# Patient Record
Sex: MALE | Race: WHITE
[De-identification: names, ages, dates, MRNs, and addresses within clinical notes are randomized per-mention and may not be internally consistent; named-entity substitution may affect disease eponyms.]

---

## 2024-08-15 ENCOUNTER — DASHBOARD ENCOUNTERS (OUTPATIENT)
Age: 19
End: 2024-08-15

## 2024-08-15 ENCOUNTER — OFFICE VISIT (OUTPATIENT)
Dept: URBAN - METROPOLITAN AREA CLINIC 12 | Facility: CLINIC | Age: 19
End: 2024-08-15
Payer: SELF-PAY

## 2024-08-15 ENCOUNTER — OFFICE VISIT (OUTPATIENT)
Dept: URBAN - METROPOLITAN AREA CLINIC 27 | Facility: CLINIC | Age: 19
End: 2024-08-15

## 2024-08-15 ENCOUNTER — LAB OUTSIDE AN ENCOUNTER (OUTPATIENT)
Dept: URBAN - METROPOLITAN AREA CLINIC 12 | Facility: CLINIC | Age: 19
End: 2024-08-15

## 2024-08-15 ENCOUNTER — TELEPHONE ENCOUNTER (OUTPATIENT)
Dept: URBAN - METROPOLITAN AREA CLINIC 6 | Facility: CLINIC | Age: 19
End: 2024-08-15

## 2024-08-15 VITALS
TEMPERATURE: 97.8 F | HEIGHT: 73 IN | HEART RATE: 88 BPM | BODY MASS INDEX: 19.48 KG/M2 | DIASTOLIC BLOOD PRESSURE: 86 MMHG | SYSTOLIC BLOOD PRESSURE: 119 MMHG | WEIGHT: 147 LBS

## 2024-08-15 DIAGNOSIS — R13.19 ESOPHAGEAL DYSPHAGIA: ICD-10-CM

## 2024-08-15 DIAGNOSIS — R14.0 POSTPRANDIAL BLOATING: ICD-10-CM

## 2024-08-15 PROCEDURE — 99204 OFFICE O/P NEW MOD 45 MIN: CPT | Performed by: STUDENT IN AN ORGANIZED HEALTH CARE EDUCATION/TRAINING PROGRAM

## 2024-08-15 RX ORDER — OMEPRAZOLE 40 MG/1
1 CAPSULE 30 MINUTES BEFORE MORNING MEAL CAPSULE, DELAYED RELEASE ORAL ONCE A DAY
Qty: 14 | Refills: 0 | OUTPATIENT
Start: 2024-08-15

## 2024-08-15 NOTE — HPI-TODAY'S VISIT:
19-year-old male with a history of asthma on as needed albuterol who presents with 1 year of postprandial dyspnea.  Reports that over the past year he has had progressive shortness of breath after eating.  Does not associate symptom onset with any particular illness, lifestyle change, medication change.  He is accompanied by his mother who aids in providing details of the medical history.  He was raised in [place] but is currently going to school in [place], mother lives here in [place].  He went to the [employer] over the summer. Primary symptom includes shortness of breath and chest tightness after eating.  Symptoms improved with standing up and walking around, reports some chiropractic maneuvers which may improve symptoms.  Symptoms worsen after eating and when sitting down.  Does endorse some mild dysphagia or having to cough food back up.  Reports that the symptoms have caused him to be anxious.  Has trialed his as needed Ventolin at the time of shortness of breath which have not been helpful.  Does not associate symptoms with any particular food but does say is less common with shakes.  Has had difficulty going to school and attending recreational activities due to symptom burden.  Prior to an outside of these symptoms, no other active medical problems.  No family history of gastrointestinal malignancy.  No abdominal surgeries.

## 2024-08-16 ENCOUNTER — CLAIMS CREATED FROM THE CLAIM WINDOW (OUTPATIENT)
Dept: URBAN - METROPOLITAN AREA SURGERY CENTER 15 | Facility: SURGERY CENTER | Age: 19
End: 2024-08-16
Payer: SELF-PAY

## 2024-08-16 ENCOUNTER — CLAIMS CREATED FROM THE CLAIM WINDOW (OUTPATIENT)
Dept: URBAN - METROPOLITAN AREA CLINIC 4 | Facility: CLINIC | Age: 19
End: 2024-08-16
Payer: SELF-PAY

## 2024-08-16 DIAGNOSIS — K21.9 GASTRO-ESOPHAGEAL REFLUX DISEASE WITHOUT ESOPHAGITIS: ICD-10-CM

## 2024-08-16 DIAGNOSIS — K31.89 OTHER DISEASES OF STOMACH AND DUODENUM: ICD-10-CM

## 2024-08-16 DIAGNOSIS — R13.19 CERVICAL DYSPHAGIA: ICD-10-CM

## 2024-08-16 DIAGNOSIS — K25.7 CHRONIC GASTRIC ULCER WITHOUT HEMORRHAGE OR PERFORATION: ICD-10-CM

## 2024-08-16 DIAGNOSIS — K21.9 ACID REFLUX: ICD-10-CM

## 2024-08-16 PROCEDURE — 43239 EGD BIOPSY SINGLE/MULTIPLE: CPT | Performed by: STUDENT IN AN ORGANIZED HEALTH CARE EDUCATION/TRAINING PROGRAM

## 2024-08-16 PROCEDURE — 88312 SPECIAL STAINS GROUP 1: CPT | Performed by: PATHOLOGY

## 2024-08-16 PROCEDURE — 88305 TISSUE EXAM BY PATHOLOGIST: CPT | Performed by: PATHOLOGY

## 2024-08-16 PROCEDURE — 00731 ANES UPR GI NDSC PX NOS: CPT | Performed by: NURSE ANESTHETIST, CERTIFIED REGISTERED

## 2024-08-16 RX ORDER — OMEPRAZOLE 40 MG/1
1 CAPSULE 30 MINUTES BEFORE MORNING MEAL CAPSULE, DELAYED RELEASE ORAL ONCE A DAY
Qty: 14 | Refills: 0 | Status: ACTIVE | COMMUNITY
Start: 2024-08-15

## 2024-08-22 ENCOUNTER — OFFICE VISIT (OUTPATIENT)
Dept: URBAN - METROPOLITAN AREA CLINIC 12 | Facility: CLINIC | Age: 19
End: 2024-08-22

## 2024-08-26 ENCOUNTER — OFFICE VISIT (OUTPATIENT)
Dept: URBAN - METROPOLITAN AREA CLINIC 12 | Facility: CLINIC | Age: 19
End: 2024-08-26

## 2024-08-26 RX ORDER — OMEPRAZOLE 40 MG/1
1 CAPSULE 30 MINUTES BEFORE MORNING MEAL CAPSULE, DELAYED RELEASE ORAL ONCE A DAY
Qty: 14 | Refills: 0 | Status: ACTIVE | COMMUNITY
Start: 2024-08-15

## 2024-08-29 ENCOUNTER — OFFICE VISIT (OUTPATIENT)
Dept: URBAN - METROPOLITAN AREA CLINIC 12 | Facility: CLINIC | Age: 19
End: 2024-08-29

## 2024-09-03 ENCOUNTER — OFFICE VISIT (OUTPATIENT)
Dept: URBAN - METROPOLITAN AREA CLINIC 23 | Facility: CLINIC | Age: 19
End: 2024-09-03
Payer: COMMERCIAL

## 2024-09-03 ENCOUNTER — OFFICE VISIT (OUTPATIENT)
Dept: URBAN - METROPOLITAN AREA CLINIC 23 | Facility: CLINIC | Age: 19
End: 2024-09-03

## 2024-09-03 VITALS
BODY MASS INDEX: 19.88 KG/M2 | SYSTOLIC BLOOD PRESSURE: 130 MMHG | HEIGHT: 73 IN | DIASTOLIC BLOOD PRESSURE: 81 MMHG | WEIGHT: 150 LBS | TEMPERATURE: 97.9 F | HEART RATE: 75 BPM

## 2024-09-03 DIAGNOSIS — R13.19 ESOPHAGEAL DYSPHAGIA: ICD-10-CM

## 2024-09-03 PROCEDURE — 99213 OFFICE O/P EST LOW 20 MIN: CPT | Performed by: NURSE PRACTITIONER

## 2024-09-03 RX ORDER — OMEPRAZOLE 40 MG/1
1 CAPSULE 30 MINUTES BEFORE MORNING MEAL CAPSULE, DELAYED RELEASE ORAL ONCE A DAY
Qty: 14 | Refills: 0 | Status: ON HOLD | COMMUNITY
Start: 2024-08-15

## 2024-09-03 RX ORDER — OMEPRAZOLE 40 MG/1
1 CAPSULE 30 MINUTES BEFORE MORNING MEAL CAPSULE, DELAYED RELEASE ORAL ONCE A DAY
Qty: 14 | Refills: 0 | Status: ACTIVE | COMMUNITY
Start: 2024-08-15

## 2024-09-03 NOTE — HPI-TODAY'S VISIT:
19 year old here for follow up on EGD 8/16/24 for dysphagia.   Findings of normal esophagus and gastritis. Pathology of gastric biopsies negative for h. pylori, intestinal metaplasia, dysplasia and malignancy. Pathology of esophageal  biopsies with reflux type changes without Dolan's esophagus. No evidence of eosinophilic esophagitis or infectious organisms.   Recommended omeprazole 40mg daily but did not take.  Changes in diet- eating "" "anti-inflammatory foods" with improved symptoms Symptoms improving over the last 2-3 weeks. Change since 8/22/24. Slow improvement. Minimal heart burn for the last week.  Able to eat full meals.  Struggles mostly at night with breathing. Seen by PCP who recommended Pulmonologist evaluation.  Seen by a Pulmonologist Dr. Thibodeaux with normal CXR and pulmonary function test.  Seen by a Cardiologist  with normal ECG and ECHO.  Seen by an allergist as well. Allergic to dust mites, dogs and cats. No allergies to food or environmental allergens. Possible allergenic asthma.  Took 1 dose of prednisone and couldn't sleep for 22 hours. Didn't take any other doses.   Asking if his symptoms are related to his liver or pancreas.  Also reports poor mobility and concern for a trapped nerve- recommend he follow up with his PCP.  =========================================================================== OV 8/15/24 19-year-old male with a history of asthma on as needed albuterol who presents with 1 year of postprandial dyspnea.  Reports that over the past year he has had progressive shortness of breath after eating.  Does not associate symptom onset with any particular illness, lifestyle change, medication change.  He is accompanied by his mother who aids in providing details of the medical history.  He was raised in [place] but is currently going to school in [place], mother lives here in [place].  He went to the [employer] over the [place]. Primary symptom includes shortness of breath and chest tightness after eating.  Symptoms improved with standing up and walking around, reports some chiropractic maneuvers which may improve symptoms.  Symptoms worsen after eating and when sitting down.  Does endorse some mild dysphagia or having to cough food back up.  Reports that the symptoms have caused him to be anxious.  Has trialed his as needed Ventolin at the time of shortness of breath which have not been helpful.  Does not associate symptoms with any particular food but does say is less common with shakes.  Has had difficulty going to school and attending recreational activities due to symptom burden.  Prior to an outside of these symptoms, no other active medical problems.  No family history of gastrointestinal malignancy.  No abdominal surgeries.

## 2024-09-03 NOTE — EXAM-PHYSICAL EXAM
General--no acute distress, well nourished, accompained by his mother Eyes--anicteric, clear conjunctiva HENT--normocephalic, atraumatic head, oropharynx clear Chest--clear to auscultation anteriorly and posteriorly, unlabored breathing, equal chest rise and fall Heart--regular rate and rhythm, no lower extremity edema Abdomen--soft, non tender, non distended, bowel sounds present Skin--no rashes, no jaundice, no pallor Neurologic--alert and appropriate, speaking quickly Psych--appears agitated

## 2024-09-04 ENCOUNTER — OFFICE VISIT (OUTPATIENT)
Dept: URBAN - METROPOLITAN AREA SURGERY CENTER 8 | Facility: SURGERY CENTER | Age: 19
End: 2024-09-04

## 2024-09-05 ENCOUNTER — OFFICE VISIT (OUTPATIENT)
Dept: URBAN - METROPOLITAN AREA CLINIC 12 | Facility: CLINIC | Age: 19
End: 2024-09-05